# Patient Record
(demographics unavailable — no encounter records)

---

## 2025-05-13 NOTE — PHYSICAL EXAM
[FreeTextEntry1] : Vital signs were recorded and unremarkable.  Head neck, ear nose and throat was unremarkable.  There was no carotid bruit thyromegaly or lymphadenopathy.  The surgical scars are healthy without any tenderness or muscle spasm.  His lordotic curve is straightened out externally.  There is no Tinel sign at wrist or elbow.  Pedal pulsations are present there is no leg edema.  There are no meningeal signs straight leg raising test is negative and cervical spine range of motion is moderately restricted.  Neurologically he is alert oriented quite intelligent and still holds a high position in [employer] has no receptive or expressive language deficits appears to have good insight and judgment but appears to be anxious and somewhat depressed because of his ongoing problems of disability in the neuromuscular area.  Cranial nerve examination revealed atrophy of the tongue with geographic tongue and intense fasciculations.  Gag reflex is present but slow and he attributes it to recent spine surgery and is apparently recovering slowly with therapy.  There is no facial weakness but has muscle atrophy and he had lost some weight following surgery.  Eyegrounds are normal visual fields are full extraocular movements are full no nystagmus and neck muscles are +4/5 attributed to recent surgical decompression.  He has widespread fasciculations in all 4 limbs including his torso which are more prominent on the left is in comparison to the right including thoracic wall muscles and he is diffusely weak with atrophic generalized muscle weakness of 3-4/5 with a slow gait with slow finger-to-nose and heel-to-shin testing without clumsiness and his deep tendon reflexes are 2+ in the upper extremities symmetric whereas the knee reflexes are 2++ on the right and 3+ on the left ankles are absent and there is no Babinski sign.  Detailed sensory evaluation was unremarkable.  There was most prominent atrophy of the first dorsal interosseous muscle bilaterally and less so in the abductor pollicis brevis and as stated he had widespread fasciculations.

## 2025-05-13 NOTE — HISTORY OF PRESENT ILLNESS
[FreeTextEntry1] : Mr. Kelsey a 78-year-old gentleman who accompanied his wife for neurological consultation and was referred by Dr. Yi took upon himself to find me and made an appointment.  The background history revealed that he had cervical spine surgery from C4-C7 in 2024 by Dr. Radha dunn with an anterior approach but the surgery did not help him and was recently also evaluated by Dr. Chu with an EMG study who stated that he had C5-C6 nerve damage and did not discuss any further details and I do not have his current report and I will try to secure it.  Right at the OUTSET of this consultation he stated that he has family history of amyotrophic lateral sclerosis in his sister and his maternal grandmother  of complications of Parkinson's disease.  Current complaints the patient stated that he has poor control of his muscles proximal greater than distal in both upper and lower extremity and a speech alteration but that occurred after cervical spine surgery with slow swallowing and he is getting physical and speech therapy and getting better.  Currently there is no neck pain or clear radicular symptoms but has impaired balance on walking as he staggers.  He also noticed that his left lateral rotation of cervical spine is poor but does not induce radicular symptoms.  He recalled that he had lumbar spine surgery by Dr. SUJATA avilez in 2023 and he completely improved and is currently using treadmill to walk 4 to 5 miles.  Past medical history is negative for any coronary heart disease respiratory disorders GI or genitourinary disease diabetes hypertension or history of cancer and additional surgical history revealed that he had hip replacement on the right in year  and tonsillectomy as a child.  He had some degree of weight loss after the cervical spine surgery.  He has no toxic habits  and family history revealed that her daughter age 50 has spina bifida had multiple operations father  of coronary heart disease mother had cancer of the bone and there is family history of amyotrophic lateral sclerosis and paternal maternal grandmother had history of ALS and she  of the complications.  I reviewed his medications allergies and labs.

## 2025-05-13 NOTE — REVIEW OF SYSTEMS
[Feeling Poorly] : feeling poorly [Anxiety] : anxiety [Arm Weakness] : arm weakness [Hand Weakness] :  hand weakness [Leg Weakness] : leg weakness [Poor Coordination] : poor coordination [Difficulty Walking] : difficulty walking [Ataxia] : ataxia [As Noted in HPI] : as noted in HPI [Arthralgias] : arthralgias [Negative] : Heme/Lymph

## 2025-05-13 NOTE — DATA REVIEWED
[de-identified] : I reviewed the past imaging studies of the spine and there was extensive changes requiring prior surgical treatment for both cervical and lumbar spine.  The spinal cord itself was unremarkable. [de-identified] : The patient had electrodiagnostic studies recently by Dr. Chu her neurologist who suggested that he has chronic neuropathic changes at C5-C6 as result of the cervical spine surgery and damage due to cervical spine disease.  I do not have the record and I will try to secure it and the patient will also try to secure the report. [de-identified] : I reviewed the lab tests and there is no discernible abnormality of any neurological pertinence with negative Lyme disease his radiologic and MRI imaging studies confirmed that he had cervical and lumbar spine disease which were operated but his cervical spine surgery failed.

## 2025-05-13 NOTE — DISCUSSION/SUMMARY
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 Chu.  I had a detailed conversation with him but I could not disclose the diagnosis without confirmation with electrodiagnostic studies and thereafter once I confirm it I will refer him to Dr. Julianna MD to endorse him into the ALS clinic with multi modality subspecialty consultations and care but this will be discussed with the patient after the electrodiagnostic study.  He expressed understanding and will comply.

## 2025-07-14 NOTE — DISCUSSION/SUMMARY
[FreeTextEntry1] :   ALS FRS 38  Evaluated by me today in multidisciplinary ALS clinic; required evaluations today by PT/OT/speech and swallowing therapists. Social work needs addressed, and goals of care reinforced. End-of-life care including healthcare proxy and patient wishes were discussed.  Neurology: continue riluzole and radicava atropine drop for sialorrhea genetic testing fall risk discussed about feeding tube. suction device register to [facility]: no healthcare proxy  Nutritional/dietary needs discussed and addressed.  Pulmonary function was assessed by respiratory therapy and spirometry.  Patient's respiratory function is O2 Sat FVC: 3/79 96% predicted NIF-40   PT/OT recommends: left shoulder sublux: needs sling home OT for safety assessment. giv-peña sling left foot.  continue PT.  recommend not to do too much therapy.  will have PT eval accessibility features on computer.  Speech swallow therapy recommends:  coughing a lot, he used a lot of atropine. small sip each time. max phonation time 14.9  voice banking as soon as possible  Follow-up in 4 months at ALS clinic

## 2025-07-14 NOTE — DISCUSSION/SUMMARY
[FreeTextEntry1] :   ALS FRS 38  Evaluated by me today in multidisciplinary ALS clinic; required evaluations today by PT/OT/speech and swallowing therapists. Social work needs addressed, and goals of care reinforced. End-of-life care including healthcare proxy and patient wishes were discussed.  Neurology: continue riluzole and radicava atropine drop for sialorrhea genetic testing fall risk discussed about feeding tube. suction device register to [facility]: no healthcare proxy  Nutritional/dietary needs discussed and addressed.  Pulmonary function was assessed by respiratory therapy and spirometry.  Patient's respiratory function is O2 Sat FVC: 3/79 96% predicted NIF-40   PT/OT recommends: left shoulder sublux: needs sling home OT for safety assessment. giv-peña sling left foot.  continue PT.  recommend not to do too much therapy.  will have PT eval accessibility features on computer.  Speech swallow therapy recommends:  coughing a lot, he used a lot of atropine. small sip each time. max phonation time 14.9  voice banking as soon as possible  Follow-up in 4 months at ALS clinic

## 2025-07-14 NOTE — PHYSICAL EXAM
[FreeTextEntry1] : neck flexion 4/5 neck  sternocleidomastoid weak 4/5 dysarthria marked weakness left arm deltoids 2/5, biceps 1/5, finger flexion 4/5 atrophy with split hand positive both hands. RUE: del 3/5 biceps 4/5 intrinsic hand atrophy bilateral foot drop  DTRs brisk sensory normal

## 2025-07-14 NOTE — HISTORY OF PRESENT ILLNESS
[FreeTextEntry1] :  ALS diagnosed in 2024 He had back sx in December C4-7. Pain in his neck and weakness on his arm left. He noticed saliva problem, sialorrhea with speech difficulty last summer 2024. sialorrhea for couple years. In fall 2024, he noticed his left arm weaked. 2 months ago, he noticed right arm is getting weak. Legs are strong.  His head is tilted to the right. going to PT 3 times a week. wt; increase 3 lbs, but lost a lot of wt after surgery. drinks high prot.  regular food, he has problem, choked or gagging. He adjusted his food to pureed. sometimes gag with water. falls 3 times, last time 4 days ago. He walked 3hours a day. sleep 8 hours a day, nap during the day about an hour.  Use 1 pillow. no morning headache.  no neck pain.  He use scopolamine and atropine to dry saliva Mood: good. He is still working from home, [employer]. [facility] He stopped driving approx 6 weeks ago due to arm weakness. No PBP.  Cognition: good.   His sister had ALS and passed away at 59. His grandmother had PD in late 60s.

## 2025-07-14 NOTE — HISTORY OF PRESENT ILLNESS
[FreeTextEntry1] :  ALS diagnosed in 2024 He had back sx in December C4-7. Pain in his neck and weakness on his arm left. He noticed saliva problem, sialorrhea with speech difficulty last summer 2024. sialorrhea for couple years. In fall 2024, he noticed his left arm weaked. 2 months ago, he noticed right arm is getting weak. Legs are strong.  His head is tilted to the right. going to PT 3 times a week. wt; increase 3 lbs, but lost a lot of wt after surgery. drinks high prot.  regular food, he has problem, choked or gagging. He adjusted his food to pureed. sometimes gag with water. falls 3 times, last time 4 days ago. He walked 3hours a day. sleep 8 hours a day, nap during the day about an hour.  Use 1 pillow. no morning headache.  no neck pain.  He use scopolamine and atropine to dry saliva Mood: good. He is still working from home, [employer]. [facility]. He stopped driving approx 6 weeks ago due to arm weakness. No PBP.  Cognition: good.   His sister had ALS and passed away at 59. His grandmother had PD in late 60s.